# Patient Record
Sex: MALE | Race: WHITE | Employment: FULL TIME | URBAN - METROPOLITAN AREA
[De-identification: names, ages, dates, MRNs, and addresses within clinical notes are randomized per-mention and may not be internally consistent; named-entity substitution may affect disease eponyms.]

---

## 2019-04-28 ENCOUNTER — HOSPITAL ENCOUNTER (OUTPATIENT)
Dept: SLEEP CENTER | Age: 64
Discharge: HOME | End: 2019-04-28
Payer: COMMERCIAL

## 2019-04-28 PROCEDURE — 95810 POLYSOM 6/> YRS 4/> PARAM: CPT

## 2019-05-06 NOTE — PROGRESS NOTES
[address]                               SLEEP STUDY REPORT    PATIENT NAME: Daryle Riedel                     :        1955  MED REC NO:   [id number]                            ROOM:  ACCOUNT NO:   [de-identified]                           ADMIT DATE: 2019  PROVIDER:     Arnol Mccall MD    DATE OF STUDY:  2019    ATTENDING:  Dr. Chacon Lacks:  Arnol Mccall MD    INDICATIONS:  Clinically, there are concerns suggestive of sleep apnea. There is evidence of moderate obesity with a weight of 250 pounds, BMI  of 34, ESS of 2, neck of 16 inches. CLINICAL COMPLAINTS:  Include snoring and naps. MEDICATIONS AND MEDICAL COMORBIDITIES:  As noted. SLEEP ARCHITECTURE:   minutes,  minutes, sleep efficiency  of 84%. SLEEP STAGES:  N1 4.6%, N2 54%, N3 17%, REM 23%. LATENCY:  N1 0 minute, N2 1 minute, N3 102 minutes,  minutes. VENTILATION SUMMARY:  With an apnea plus hypopnea index of 10. PLMS SUMMARY:  With arousals with an index of 0. OXYGENATION SUMMARY:  Mean of 92%. HEART RATE SUMMARY:  Mean of 58 beats per minute. CONCLUSION:  This nocturnal diagnostic polysomnography does demonstrate  evidence of a mild obstructive sleep apnea with an apnea plus hypopnea  index of 10. With this index and with the clinical presentation, my  recommendation will be for aggressive weight control in addition to  avoidance of sedatives, narcotics, hypnotics, or any other substances  that could worsen the apnea. Once the above is accomplished and if the  patient fails to improve, a polysomnography with CPAP titration will  then be indicated. Thank you kindly.       Manolo Rosario MD    D: 2019 11:44:53       T: 2019 5:07:56     [email or web address]  Job#: [id number]     Doc#: [id number]    CC:

## 2019-05-20 PROCEDURE — 95810 POLYSOM 6/> YRS 4/> PARAM: CPT | Performed by: INTERNAL MEDICINE
